# Patient Record
Sex: MALE | Race: WHITE | URBAN - METROPOLITAN AREA
[De-identification: names, ages, dates, MRNs, and addresses within clinical notes are randomized per-mention and may not be internally consistent; named-entity substitution may affect disease eponyms.]

---

## 2022-09-21 ENCOUNTER — OFFICE VISIT (OUTPATIENT)
Dept: URBAN - METROPOLITAN AREA CLINIC 54 | Facility: CLINIC | Age: 67
End: 2022-09-21
Payer: MEDICARE

## 2022-09-21 ENCOUNTER — WEB ENCOUNTER (OUTPATIENT)
Dept: URBAN - METROPOLITAN AREA CLINIC 54 | Facility: CLINIC | Age: 67
End: 2022-09-21

## 2022-09-21 VITALS
BODY MASS INDEX: 32.26 KG/M2 | TEMPERATURE: 97.8 F | DIASTOLIC BLOOD PRESSURE: 85 MMHG | HEIGHT: 74 IN | SYSTOLIC BLOOD PRESSURE: 126 MMHG | HEART RATE: 67 BPM | WEIGHT: 251.4 LBS

## 2022-09-21 DIAGNOSIS — R11.0 NAUSEA: ICD-10-CM

## 2022-09-21 DIAGNOSIS — E80.6 HYPERBILIRUBINEMIA: ICD-10-CM

## 2022-09-21 DIAGNOSIS — K59.00 CONSTIPATION, UNSPECIFIED CONSTIPATION TYPE: ICD-10-CM

## 2022-09-21 DIAGNOSIS — R63.4 WEIGHT LOSS: ICD-10-CM

## 2022-09-21 PROCEDURE — 99204 OFFICE O/P NEW MOD 45 MIN: CPT | Performed by: STUDENT IN AN ORGANIZED HEALTH CARE EDUCATION/TRAINING PROGRAM

## 2022-09-21 RX ORDER — DIAZEPAM 10 MG/1
1 TABLET AS NEEDED TABLET ORAL TWICE DAILY
Status: ACTIVE | COMMUNITY

## 2022-09-21 RX ORDER — DICYCLOMINE HYDROCHLORIDE 20 MG/1
1 TABLET TABLET ORAL
Status: ACTIVE | COMMUNITY

## 2022-09-21 RX ORDER — CLONIDINE HYDROCHLORIDE 0.1 MG/1
1 TABLET TABLET ORAL ONCE A DAY
Status: ACTIVE | COMMUNITY

## 2022-09-21 RX ORDER — PRAVASTATIN SODIUM 20 MG/1
1 TABLET TABLET ORAL ONCE A DAY
Status: ACTIVE | COMMUNITY

## 2022-09-21 RX ORDER — ONDANSETRON 8 MG/1
1 TABLET ON THE TONGUE AND ALLOW TO DISSOLVE  AS NEEDED TABLET, ORALLY DISINTEGRATING ORAL ONCE A DAY
Status: ACTIVE | COMMUNITY

## 2022-09-21 RX ORDER — SERTRALINE 50 MG/1
TAKE 2 TABLETS TABLET, FILM COATED ORAL ONCE A DAY
Status: ACTIVE | COMMUNITY

## 2022-09-21 RX ORDER — DILTIAZEM HYDROCHLORIDE 120 MG/1
1 CAPSULE CAPSULE, COATED, EXTENDED RELEASE ORAL ONCE A DAY
Status: ACTIVE | COMMUNITY

## 2022-09-21 RX ORDER — LORATADINE 10 MG
1 PACKET MIXED WITH 8 OUNCES OF FLUID TABLET,DISINTEGRATING ORAL ONCE A DAY
Qty: 30 PACKET | Refills: 2 | OUTPATIENT
Start: 2022-09-21 | End: 2022-12-19

## 2022-09-21 RX ORDER — IMIPRAMINE HYDROCHLORIDE 50 MG/1
1 TABLET TABLET, FILM COATED ORAL ONCE A DAY
Status: ACTIVE | COMMUNITY

## 2022-09-21 RX ORDER — PANTOPRAZOLE SODIUM 40 MG/1
1 TABLET TABLET, DELAYED RELEASE ORAL ONCE A DAY
Status: ACTIVE | COMMUNITY

## 2022-09-21 RX ORDER — COLCHICINE 0.6 MG/1
1 TABLET TABLET, FILM COATED ORAL
Status: ACTIVE | COMMUNITY

## 2022-09-21 RX ORDER — LEVOTHYROXINE SODIUM 25 UG/1
1 TABLET IN THE MORNING ON AN EMPTY STOMACH TABLET ORAL ONCE A DAY
Status: ACTIVE | COMMUNITY

## 2022-09-21 RX ORDER — ASPIRIN 81 MG/1
1 TABLET TABLET, COATED ORAL ONCE A DAY
Status: ACTIVE | COMMUNITY

## 2022-09-21 RX ORDER — CHOLECALCIFEROL (VITAMIN D3) 50 MCG
1 CAPSULE CAPSULE ORAL ONCE A DAY
Status: ACTIVE | COMMUNITY

## 2022-09-21 NOTE — HPI-TODAY'S VISIT:
Mr. Becker is a 67-year-old man who presents today for abdominal pain and nausea.  His history significant for generalized anxiety disorder hypertension, esophageal reflux, type 2 diabetes.  Patient presented to the emergency room on September 12 for nausea and abdominal discomfort.  This is been gradual onset for 6 months prior.  ED labs significant for bilirubin 1.2 creatinine 1.38 hemoglobin 18.2 platelets 228, WBC 8.2.  CT abdomen pelvis with contrast significant for small hiatal hernia with gastroesophageal reflux.  Patient states that he is nauseous all the time.  He states that this is not made worse with eating.  He has significantly creased p.o. intake.  He states he has lost 20 pounds over the last 3 weeks.  He denies diarrhea. He denies abdominal pain. He does endorse constipation.  He used to have bowel movements every day.  Now he has bowel movements every 3 days or so.  He is on dicyclomine.  He is taking pantoprazole daily.  There has been no improvement in the symptoms.  His last colonoscopy was 5 to 7 years ago.  He does not remember the result.

## 2022-09-21 NOTE — EXAM-PHYSICAL EXAM
General: Distraught.  Anxious affect. HEENT: Anicteric sclerae Cardiovascular: Normal heart rate Respiratory: Breathing comfortably without conversational dyspnea Abdomen:  non-distended, soft, non-tender Rectal: Deferred Skin: without visible rashes on examined areas. Musculoskeletal: ambulated without difficulty. Can stand from sitting position without assistance. Neuro: No gross focal deficits. Alert and oriented. Psych: Anxious mood and affect.

## 2022-09-22 ENCOUNTER — CLAIMS CREATED FROM THE CLAIM WINDOW (OUTPATIENT)
Dept: URBAN - METROPOLITAN AREA CLINIC 4 | Facility: CLINIC | Age: 67
End: 2022-09-22
Payer: MEDICARE

## 2022-09-22 ENCOUNTER — OFFICE VISIT (OUTPATIENT)
Dept: URBAN - METROPOLITAN AREA SURGERY CENTER 14 | Facility: SURGERY CENTER | Age: 67
End: 2022-09-22
Payer: MEDICARE

## 2022-09-22 DIAGNOSIS — K31.7 POLYP OF STOMACH AND DUODENUM: ICD-10-CM

## 2022-09-22 DIAGNOSIS — K31.89 ACQUIRED DEFORMITY OF DUODENUM: ICD-10-CM

## 2022-09-22 DIAGNOSIS — R63.4 ABNORMAL INTENTIONAL WEIGHT LOSS: ICD-10-CM

## 2022-09-22 DIAGNOSIS — K31.89 OTHER DISEASES OF STOMACH AND DUODENUM: ICD-10-CM

## 2022-09-22 DIAGNOSIS — K31.7 BENIGN GASTRIC POLYP: ICD-10-CM

## 2022-09-22 DIAGNOSIS — R11.0 AM NAUSEA: ICD-10-CM

## 2022-09-22 PROCEDURE — 88305 TISSUE EXAM BY PATHOLOGIST: CPT | Performed by: PATHOLOGY

## 2022-09-22 PROCEDURE — 88342 IMHCHEM/IMCYTCHM 1ST ANTB: CPT | Performed by: PATHOLOGY

## 2022-09-22 PROCEDURE — 88341 IMHCHEM/IMCYTCHM EA ADD ANTB: CPT | Performed by: PATHOLOGY

## 2022-09-22 PROCEDURE — 88312 SPECIAL STAINS GROUP 1: CPT | Performed by: PATHOLOGY

## 2022-09-22 PROCEDURE — 43251 EGD REMOVE LESION SNARE: CPT | Performed by: INTERNAL MEDICINE

## 2022-09-22 PROCEDURE — G8907 PT DOC NO EVENTS ON DISCHARG: HCPCS | Performed by: INTERNAL MEDICINE

## 2022-09-22 PROCEDURE — 43239 EGD BIOPSY SINGLE/MULTIPLE: CPT | Performed by: INTERNAL MEDICINE

## 2022-09-22 RX ORDER — ASPIRIN 81 MG/1
1 TABLET TABLET, COATED ORAL ONCE A DAY
Status: ACTIVE | COMMUNITY

## 2022-09-22 RX ORDER — IMIPRAMINE HYDROCHLORIDE 50 MG/1
1 TABLET TABLET, FILM COATED ORAL ONCE A DAY
Status: ACTIVE | COMMUNITY

## 2022-09-22 RX ORDER — LEVOTHYROXINE SODIUM 25 UG/1
1 TABLET IN THE MORNING ON AN EMPTY STOMACH TABLET ORAL ONCE A DAY
Status: ACTIVE | COMMUNITY

## 2022-09-22 RX ORDER — CHOLECALCIFEROL (VITAMIN D3) 50 MCG
1 CAPSULE CAPSULE ORAL ONCE A DAY
Status: ACTIVE | COMMUNITY

## 2022-09-22 RX ORDER — COLCHICINE 0.6 MG/1
1 TABLET TABLET, FILM COATED ORAL
Status: ACTIVE | COMMUNITY

## 2022-09-22 RX ORDER — DICYCLOMINE HYDROCHLORIDE 20 MG/1
1 TABLET TABLET ORAL
Status: ACTIVE | COMMUNITY

## 2022-09-22 RX ORDER — ONDANSETRON 8 MG/1
1 TABLET ON THE TONGUE AND ALLOW TO DISSOLVE  AS NEEDED TABLET, ORALLY DISINTEGRATING ORAL ONCE A DAY
Status: ACTIVE | COMMUNITY

## 2022-09-22 RX ORDER — PANTOPRAZOLE SODIUM 40 MG/1
1 TABLET TABLET, DELAYED RELEASE ORAL ONCE A DAY
Status: ACTIVE | COMMUNITY

## 2022-09-22 RX ORDER — SERTRALINE 50 MG/1
TAKE 2 TABLETS TABLET, FILM COATED ORAL ONCE A DAY
Status: ACTIVE | COMMUNITY

## 2022-09-22 RX ORDER — CLONIDINE HYDROCHLORIDE 0.1 MG/1
1 TABLET TABLET ORAL ONCE A DAY
Status: ACTIVE | COMMUNITY

## 2022-09-22 RX ORDER — DIAZEPAM 10 MG/1
1 TABLET AS NEEDED TABLET ORAL TWICE DAILY
Status: ACTIVE | COMMUNITY

## 2022-09-22 RX ORDER — LORATADINE 10 MG
1 PACKET MIXED WITH 8 OUNCES OF FLUID TABLET,DISINTEGRATING ORAL ONCE A DAY
Qty: 30 PACKET | Refills: 2 | Status: ACTIVE | COMMUNITY
Start: 2022-09-21 | End: 2022-12-19

## 2022-09-22 RX ORDER — PRAVASTATIN SODIUM 20 MG/1
1 TABLET TABLET ORAL ONCE A DAY
Status: ACTIVE | COMMUNITY

## 2022-09-22 RX ORDER — DILTIAZEM HYDROCHLORIDE 120 MG/1
1 CAPSULE CAPSULE, COATED, EXTENDED RELEASE ORAL ONCE A DAY
Status: ACTIVE | COMMUNITY

## 2022-09-30 ENCOUNTER — TELEPHONE ENCOUNTER (OUTPATIENT)
Dept: URBAN - METROPOLITAN AREA CLINIC 54 | Facility: CLINIC | Age: 67
End: 2022-09-30

## 2022-10-06 ENCOUNTER — TELEPHONE ENCOUNTER (OUTPATIENT)
Dept: URBAN - METROPOLITAN AREA CLINIC 54 | Facility: CLINIC | Age: 67
End: 2022-10-06

## 2022-10-10 ENCOUNTER — WEB ENCOUNTER (OUTPATIENT)
Dept: URBAN - METROPOLITAN AREA CLINIC 54 | Facility: CLINIC | Age: 67
End: 2022-10-10

## 2022-10-20 ENCOUNTER — CLAIMS CREATED FROM THE CLAIM WINDOW (OUTPATIENT)
Dept: URBAN - METROPOLITAN AREA CLINIC 54 | Facility: CLINIC | Age: 67
End: 2022-10-20

## 2022-10-20 ENCOUNTER — CLAIMS CREATED FROM THE CLAIM WINDOW (OUTPATIENT)
Dept: URBAN - METROPOLITAN AREA CLINIC 54 | Facility: CLINIC | Age: 67
End: 2022-10-20
Payer: MEDICARE

## 2022-10-20 ENCOUNTER — OFFICE VISIT (OUTPATIENT)
Dept: URBAN - METROPOLITAN AREA CLINIC 54 | Facility: CLINIC | Age: 67
End: 2022-10-20

## 2022-10-20 ENCOUNTER — DASHBOARD ENCOUNTERS (OUTPATIENT)
Age: 67
End: 2022-10-20

## 2022-10-20 VITALS
BODY MASS INDEX: 32.01 KG/M2 | TEMPERATURE: 97.8 F | WEIGHT: 249.4 LBS | HEART RATE: 76 BPM | DIASTOLIC BLOOD PRESSURE: 91 MMHG | HEIGHT: 74 IN | SYSTOLIC BLOOD PRESSURE: 121 MMHG

## 2022-10-20 DIAGNOSIS — K59.00 CONSTIPATION, UNSPECIFIED CONSTIPATION TYPE: ICD-10-CM

## 2022-10-20 DIAGNOSIS — E80.6 HYPERBILIRUBINEMIA: ICD-10-CM

## 2022-10-20 DIAGNOSIS — K59.09 CHRONIC CONSTIPATION: ICD-10-CM

## 2022-10-20 DIAGNOSIS — R11.0 NAUSEA: ICD-10-CM

## 2022-10-20 PROBLEM — 14760008: Status: ACTIVE | Noted: 2022-09-21

## 2022-10-20 PROCEDURE — 99214 OFFICE O/P EST MOD 30 MIN: CPT | Performed by: STUDENT IN AN ORGANIZED HEALTH CARE EDUCATION/TRAINING PROGRAM

## 2022-10-20 RX ORDER — DILTIAZEM HYDROCHLORIDE 120 MG/1
1 CAPSULE CAPSULE, COATED, EXTENDED RELEASE ORAL ONCE A DAY
Status: ACTIVE | COMMUNITY

## 2022-10-20 RX ORDER — PANTOPRAZOLE SODIUM 40 MG/1
1 TABLET TABLET, DELAYED RELEASE ORAL ONCE A DAY
Qty: 30 | Refills: 2 | OUTPATIENT
Start: 2022-10-20

## 2022-10-20 RX ORDER — ASPIRIN 81 MG/1
1 TABLET TABLET, COATED ORAL ONCE A DAY
Status: ACTIVE | COMMUNITY

## 2022-10-20 RX ORDER — SERTRALINE 50 MG/1
TAKE 2 TABLETS TABLET, FILM COATED ORAL ONCE A DAY
Status: ACTIVE | COMMUNITY

## 2022-10-20 RX ORDER — PANTOPRAZOLE SODIUM 40 MG/1
1 TABLET TABLET, DELAYED RELEASE ORAL ONCE A DAY
Status: ACTIVE | COMMUNITY

## 2022-10-20 RX ORDER — CLONIDINE HYDROCHLORIDE 0.1 MG/1
1 TABLET TABLET ORAL ONCE A DAY
Status: ACTIVE | COMMUNITY

## 2022-10-20 RX ORDER — CHOLECALCIFEROL (VITAMIN D3) 50 MCG
1 CAPSULE CAPSULE ORAL ONCE A DAY
Status: ACTIVE | COMMUNITY

## 2022-10-20 RX ORDER — PRAVASTATIN SODIUM 20 MG/1
1 TABLET TABLET ORAL ONCE A DAY
Status: ACTIVE | COMMUNITY

## 2022-10-20 RX ORDER — DIAZEPAM 10 MG/1
1 TABLET AS NEEDED TABLET ORAL TWICE DAILY
Status: ACTIVE | COMMUNITY

## 2022-10-20 RX ORDER — LORATADINE 10 MG
1 PACKET MIXED WITH 8 OUNCES OF FLUID TABLET,DISINTEGRATING ORAL ONCE A DAY
Qty: 30 PACKET | Refills: 2 | Status: ACTIVE | COMMUNITY
Start: 2022-09-21 | End: 2022-12-19

## 2022-10-20 RX ORDER — LEVOTHYROXINE SODIUM 25 UG/1
1 TABLET IN THE MORNING ON AN EMPTY STOMACH TABLET ORAL ONCE A DAY
Status: ACTIVE | COMMUNITY

## 2022-10-20 RX ORDER — ONDANSETRON 8 MG/1
1 TABLET ON THE TONGUE AND ALLOW TO DISSOLVE  AS NEEDED TABLET, ORALLY DISINTEGRATING ORAL ONCE A DAY
Status: ACTIVE | COMMUNITY

## 2022-10-20 RX ORDER — COLCHICINE 0.6 MG/1
1 TABLET TABLET, FILM COATED ORAL
Status: ACTIVE | COMMUNITY

## 2022-10-20 RX ORDER — LINACLOTIDE 145 UG/1
2 CAPSULES AT LEAST 30 MINUTES BEFORE THE FIRST MEAL OF THE DAY ON AN EMPTY STOMACH CAPSULE, GELATIN COATED ORAL ONCE A DAY
Qty: 30 CAPSULE | Refills: 2 | OUTPATIENT
Start: 2022-10-20 | End: 2023-01-17

## 2022-10-20 RX ORDER — IMIPRAMINE HYDROCHLORIDE 50 MG/1
1 TABLET TABLET, FILM COATED ORAL ONCE A DAY
Status: ACTIVE | COMMUNITY

## 2022-10-20 NOTE — HPI-OTHER HISTORIES
9/21/2022 Mr. Becker is a 67-year-old man who presents today for abdominal pain and nausea.  His history significant for generalized anxiety disorder hypertension, esophageal reflux, type 2 diabetes.  Patient presented to the emergency room on September 12 for nausea and abdominal discomfort.  This is been gradual onset for 6 months prior.  ED labs significant for bilirubin 1.2 creatinine 1.38 hemoglobin 18.2 platelets 228, WBC 8.2.  CT abdomen pelvis with contrast significant for small hiatal hernia with gastroesophageal reflux.  Patient states that he is nauseous all the time.  He states that this is not made worse with eating.  He has significantly creased p.o. intake.  He states he has lost 20 pounds over the last 3 weeks.  He denies diarrhea. He denies abdominal pain. He does endorse constipation.  He used to have bowel movements every day.  Now he has bowel movements every 3 days or so.  He is on dicyclomine.  He is taking pantoprazole daily.  There has been no improvement in the symptoms.  His last colonoscopy was 5 to 7 years ago.  He does not remember the result.

## 2022-10-20 NOTE — HPI-TODAY'S VISIT:
Patient returns today to discuss his symptoms and the results of his EGD.  Patient had EGD on 9/22/2022 for nausea and weight loss. Exam was significant for gastric polyp (biopsy) and otherwise unremarkable.  Gastric biopsies were taken evaluate for H. pylori.  Gastric biopsies were unremarkable.  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  Duodenal biopsies without significant abnormality.  Patient states that he is doing well after starting Protonix.  He is interested in taking this medication in the long-term.  He states that he still not having good bowel movements despite taking MiraLAX daily.  He states that he has 1 bowel movement every 4 to 5 days.  He needs the assistance of laxatives to help have a bowel movement.  He still says that he strains significantly when having a bowel movement.  Again, he states that the symptoms all started after taking his psychiatric medications.

## 2022-10-20 NOTE — EXAM-PHYSICAL EXAM
General: Well appearing. No acute distress. HEENT: Anicteric sclerae, heering aids present Cardiovascular: Normal heart rate Respiratory: Breathing comfortably without conversational dyspnea Abdomen: Non-distended. Rectal: Deferred Skin: without visible rashes on examined areas. Musculoskeletal: Can stand from sitting position without assistance. Neuro: No gross focal deficits. Alert and oriented. Psych: Appropriate mood and affect.

## 2022-10-21 ENCOUNTER — TELEPHONE ENCOUNTER (OUTPATIENT)
Dept: URBAN - METROPOLITAN AREA CLINIC 54 | Facility: CLINIC | Age: 67
End: 2022-10-21

## 2022-10-21 PROBLEM — 82934008: Status: ACTIVE | Noted: 2022-10-21

## 2022-10-21 LAB
A/G RATIO: 1.9
ALBUMIN: 4.8
ALKALINE PHOSPHATASE: 115
ALT (SGPT): 17
AST (SGOT): 20
BILIRUBIN, DIRECT: 0.3
BILIRUBIN, TOTAL: 1.4
BUN/CREATININE RATIO: 9
BUN: 12
CALCIUM: 10.3
CARBON DIOXIDE, TOTAL: 29
CHLORIDE: 101
CREATININE: 1.39
EGFR: 56
GLOBULIN, TOTAL: 2.5
GLUCOSE: 102
POTASSIUM: 4.6
PROTEIN, TOTAL: 7.3
SODIUM: 140

## 2022-10-21 RX ORDER — LUBIPROSTONE 24 UG/1
1 CAPSULE WITH FOOD AND WATER CAPSULE, GELATIN COATED ORAL ONCE A DAY
Qty: 30 CAPSULE | Refills: 3 | OUTPATIENT
Start: 2022-10-21 | End: 2023-02-17

## 2022-10-21 RX ORDER — COLCHICINE 0.6 MG/1
1 TABLET TABLET, FILM COATED ORAL
Status: ACTIVE | COMMUNITY

## 2022-10-21 RX ORDER — SERTRALINE 50 MG/1
TAKE 2 TABLETS TABLET, FILM COATED ORAL ONCE A DAY
Status: ACTIVE | COMMUNITY

## 2022-10-21 RX ORDER — LORATADINE 10 MG
1 PACKET MIXED WITH 8 OUNCES OF FLUID TABLET,DISINTEGRATING ORAL ONCE A DAY
Qty: 30 PACKET | Refills: 2 | Status: ACTIVE | COMMUNITY
Start: 2022-09-21 | End: 2022-12-19

## 2022-10-21 RX ORDER — DILTIAZEM HYDROCHLORIDE 120 MG/1
1 CAPSULE CAPSULE, COATED, EXTENDED RELEASE ORAL ONCE A DAY
Status: ACTIVE | COMMUNITY

## 2022-10-21 RX ORDER — CHOLECALCIFEROL (VITAMIN D3) 50 MCG
1 CAPSULE CAPSULE ORAL ONCE A DAY
Status: ACTIVE | COMMUNITY

## 2022-10-21 RX ORDER — PANTOPRAZOLE SODIUM 40 MG/1
1 TABLET TABLET, DELAYED RELEASE ORAL ONCE A DAY
Qty: 30 | Refills: 2 | Status: ACTIVE | COMMUNITY
Start: 2022-10-20

## 2022-10-21 RX ORDER — ONDANSETRON 8 MG/1
1 TABLET ON THE TONGUE AND ALLOW TO DISSOLVE  AS NEEDED TABLET, ORALLY DISINTEGRATING ORAL ONCE A DAY
Status: ACTIVE | COMMUNITY

## 2022-10-21 RX ORDER — PANTOPRAZOLE SODIUM 40 MG/1
1 TABLET TABLET, DELAYED RELEASE ORAL ONCE A DAY
Status: ACTIVE | COMMUNITY

## 2022-10-21 RX ORDER — PRAVASTATIN SODIUM 20 MG/1
1 TABLET TABLET ORAL ONCE A DAY
Status: ACTIVE | COMMUNITY

## 2022-10-21 RX ORDER — ASPIRIN 81 MG/1
1 TABLET TABLET, COATED ORAL ONCE A DAY
Status: ACTIVE | COMMUNITY

## 2022-10-21 RX ORDER — DIAZEPAM 10 MG/1
1 TABLET AS NEEDED TABLET ORAL TWICE DAILY
Status: ACTIVE | COMMUNITY

## 2022-10-21 RX ORDER — CLONIDINE HYDROCHLORIDE 0.1 MG/1
1 TABLET TABLET ORAL ONCE A DAY
Status: ACTIVE | COMMUNITY

## 2022-10-21 RX ORDER — LEVOTHYROXINE SODIUM 25 UG/1
1 TABLET IN THE MORNING ON AN EMPTY STOMACH TABLET ORAL ONCE A DAY
Status: ACTIVE | COMMUNITY

## 2022-10-21 RX ORDER — IMIPRAMINE HYDROCHLORIDE 50 MG/1
1 TABLET TABLET, FILM COATED ORAL ONCE A DAY
Status: ACTIVE | COMMUNITY

## 2022-10-21 RX ORDER — LINACLOTIDE 145 UG/1
2 CAPSULES AT LEAST 30 MINUTES BEFORE THE FIRST MEAL OF THE DAY ON AN EMPTY STOMACH CAPSULE, GELATIN COATED ORAL ONCE A DAY
Qty: 30 CAPSULE | Refills: 2 | Status: ACTIVE | COMMUNITY
Start: 2022-10-20 | End: 2023-01-17

## 2022-11-03 ENCOUNTER — TELEPHONE ENCOUNTER (OUTPATIENT)
Dept: URBAN - METROPOLITAN AREA CLINIC 54 | Facility: CLINIC | Age: 67
End: 2022-11-03

## 2022-11-08 PROBLEM — 14783006: Status: ACTIVE | Noted: 2022-09-21

## 2022-11-08 PROBLEM — 422587007: Status: ACTIVE | Noted: 2022-10-20
